# Patient Record
Sex: MALE | Race: WHITE | Employment: OTHER | URBAN - METROPOLITAN AREA
[De-identification: names, ages, dates, MRNs, and addresses within clinical notes are randomized per-mention and may not be internally consistent; named-entity substitution may affect disease eponyms.]

---

## 2017-11-01 ENCOUNTER — HOSPITAL ENCOUNTER (OUTPATIENT)
Dept: OTHER | Age: 67
Discharge: HOME | End: 2017-11-30
Attending: INTERNAL MEDICINE | Admitting: INTERNAL MEDICINE

## 2017-12-01 ENCOUNTER — HOSPITAL ENCOUNTER (OUTPATIENT)
Dept: OTHER | Age: 67
Discharge: HOME | End: 2017-12-04
Attending: INTERNAL MEDICINE | Admitting: INTERNAL MEDICINE

## 2018-08-30 ENCOUNTER — APPOINTMENT (OUTPATIENT)
Dept: GENERAL RADIOLOGY | Age: 68
End: 2018-08-30
Payer: MEDICARE

## 2018-08-30 ENCOUNTER — HOSPITAL ENCOUNTER (EMERGENCY)
Age: 68
Discharge: HOME | End: 2018-08-30
Payer: MEDICARE

## 2018-08-30 VITALS
TEMPERATURE: 98.2 F | BODY MASS INDEX: 27.12 KG/M2 | SYSTOLIC BLOOD PRESSURE: 128 MMHG | HEART RATE: 62 BPM | RESPIRATION RATE: 14 BRPM | DIASTOLIC BLOOD PRESSURE: 74 MMHG | OXYGEN SATURATION: 96 % | WEIGHT: 200 LBS

## 2018-08-30 DIAGNOSIS — S92.001A CLOSED NONDISPLACED FRACTURE OF RIGHT CALCANEUS, UNSPECIFIED PORTION OF CALCANEUS, INITIAL ENCOUNTER: Primary | ICD-10-CM

## 2018-08-30 PROCEDURE — 99283 EMERGENCY DEPT VISIT LOW MDM: CPT

## 2018-08-30 PROCEDURE — 73630 X-RAY EXAM OF FOOT: CPT

## 2018-08-30 PROCEDURE — 4500000023 HC ED LEVEL 3 PROCEDURE

## 2018-08-30 RX ORDER — METOPROLOL SUCCINATE 25 MG/1
12.5 TABLET, EXTENDED RELEASE ORAL 2 TIMES DAILY
COMMUNITY

## 2018-08-30 RX ORDER — HYDROCODONE BITARTRATE AND ACETAMINOPHEN 5; 325 MG/1; MG/1
1 TABLET ORAL EVERY 8 HOURS PRN
Qty: 12 TABLET | Refills: 0 | Status: SHIPPED | OUTPATIENT
Start: 2018-08-30 | End: 2018-09-02

## 2018-08-30 ASSESSMENT — PAIN SCALES - GENERAL: PAINLEVEL_OUTOF10: 2

## 2018-08-30 ASSESSMENT — PAIN DESCRIPTION - LOCATION: LOCATION: FOOT

## 2018-08-30 NOTE — ED PROVIDER NOTES
Type of Exam: Initial FINDINGS: Linear lucency through the base of a plantar calcaneal spur without displacement. No other findings suspicious for fracture. No dislocation. Vascular calcifications noted     Suspected fracture at the base of a plantar calcaneal spur. Correlate with focal exam findings     ED COURSE   I have independently evaluated this patient. Pain control was not required here in the emergency department. X-ray imaging consistent with fracture base of plantar calcaneal spur. This correlates with physical exam. A short leg OCL splint was applied to the right lower extremity by nursing staff. Patient remained neurovascularly intact status post application. Offered crutches hour declined stating that he has some at home. Will be discharge on short course of analgesics. Have discussed addictive potential.  He reports that he will use them only as needed. Will be provided with orthopedic referral for further evaluation more definitive care. I also discussed return precautions and patient is in agreement and comfortable discharge. A discussion was had with Mr. Van Maravilla regarding foot pain, ED findings and recommendations for follow-up. All questions were answered. Patient will follow up with  Moisés Machuca. within 1 week for further evaluation/treatment. Patient will return to ED for new/worsening symptoms. Patient was sent home with a prescription for Norco.    MDM  I estimate there is LOW risk for COMPARTMENT SYNDROME, DEEP VENOUS THROMBOSIS, SEPTIC ARTHRITIS, TENDON OR NEUROVASCULAR INJURY, thus I consider the discharge disposition reasonable. Marek Cerna and I have discussed the diagnosis and risks, and we agree with discharging home to follow-up with their primary doctor or the referral orthopedist. We also discussed returning to the Emergency Department immediately if new or worsening symptoms occur.  We have discussed the symptoms which are most concerning (e.g.,

## 2018-09-05 ENCOUNTER — OFFICE VISIT (OUTPATIENT)
Dept: ORTHOPEDIC SURGERY | Age: 68
End: 2018-09-05

## 2018-09-05 VITALS
DIASTOLIC BLOOD PRESSURE: 78 MMHG | SYSTOLIC BLOOD PRESSURE: 124 MMHG | HEART RATE: 82 BPM | WEIGHT: 205 LBS | HEIGHT: 72 IN | BODY MASS INDEX: 27.77 KG/M2

## 2018-09-05 DIAGNOSIS — M79.671 RIGHT FOOT PAIN: Primary | ICD-10-CM

## 2018-09-05 PROCEDURE — L3170 FOOT PLAS HEEL STABI PRE OTS: HCPCS | Performed by: ORTHOPAEDIC SURGERY

## 2018-09-05 PROCEDURE — 99203 OFFICE O/P NEW LOW 30 MIN: CPT | Performed by: ORTHOPAEDIC SURGERY

## 2018-09-05 NOTE — PROGRESS NOTES
normal.    Radiology:     X-rays obtained and reviewed in office:  Views 3  Location right foot  Impression obtained previously shows a nondisplaced fracture through a small calcaneal plantar spur          Assessment :  Right calcaneus fracture through the plantar spur    Impression:  Encounter Diagnosis   Name Primary?  Right foot pain Yes       Office Procedures:  Orders Placed This Encounter   Procedures    Visco N Heel Shoe Inserts     Patient was prescribed a Visco N Heel Shoe Insert. The right foot  will require protection / support from this orthosis to improve their function. The orthosis will assist in protecting the affected area, provide functional support and facilitate healing. The patient was educated and fit by a healthcare professional with expert knowledge and specialization in brace application while under the direct supervision of the treating physician. Verbal and written instructions for the use of and application of this item were provided. They were instructed to contact the office immediately should the brace result in increased pain, decreased sensation, increased swelling or worsening of the condition. Treatment Plan:  I spent 20 minutes of this tissue greater than 50% face-to-face discussing treatment options. i wrote out his plan of care and copy this to the media section of his chart. i would recommend that he use visco heels modify his activity use topicals and medicines by mouth follow-up with me as needed.   we did talk about the  possibility of injection also